# Patient Record
Sex: MALE | Race: WHITE | NOT HISPANIC OR LATINO
[De-identification: names, ages, dates, MRNs, and addresses within clinical notes are randomized per-mention and may not be internally consistent; named-entity substitution may affect disease eponyms.]

---

## 2017-01-12 ENCOUNTER — RX ONLY (OUTPATIENT)
Age: 32
Setting detail: RX ONLY
End: 2017-01-12

## 2019-07-21 ENCOUNTER — APPOINTMENT (OUTPATIENT)
Dept: RADIOLOGY | Facility: MEDICAL CENTER | Age: 34
End: 2019-07-21
Attending: EMERGENCY MEDICINE
Payer: COMMERCIAL

## 2019-07-21 ENCOUNTER — HOSPITAL ENCOUNTER (EMERGENCY)
Facility: MEDICAL CENTER | Age: 34
End: 2019-07-21
Attending: EMERGENCY MEDICINE
Payer: COMMERCIAL

## 2019-07-21 VITALS
SYSTOLIC BLOOD PRESSURE: 130 MMHG | BODY MASS INDEX: 27.46 KG/M2 | DIASTOLIC BLOOD PRESSURE: 85 MMHG | TEMPERATURE: 98.6 F | HEIGHT: 70 IN | HEART RATE: 67 BPM | RESPIRATION RATE: 16 BRPM | WEIGHT: 191.8 LBS

## 2019-07-21 DIAGNOSIS — R56.9 SEIZURE (HCC): ICD-10-CM

## 2019-07-21 DIAGNOSIS — R45.1 AGITATION: ICD-10-CM

## 2019-07-21 DIAGNOSIS — R40.4 TRANSIENT ALTERATION OF AWARENESS: ICD-10-CM

## 2019-07-21 LAB
ALBUMIN SERPL BCP-MCNC: 4.5 G/DL (ref 3.2–4.9)
ALBUMIN/GLOB SERPL: 2 G/DL
ALP SERPL-CCNC: 65 U/L (ref 30–99)
ALT SERPL-CCNC: 97 U/L (ref 2–50)
ANION GAP SERPL CALC-SCNC: 8 MMOL/L (ref 0–11.9)
AST SERPL-CCNC: 54 U/L (ref 12–45)
BASOPHILS # BLD AUTO: 0.6 % (ref 0–1.8)
BASOPHILS # BLD: 0.04 K/UL (ref 0–0.12)
BILIRUB SERPL-MCNC: 0.9 MG/DL (ref 0.1–1.5)
BUN SERPL-MCNC: 7 MG/DL (ref 8–22)
CALCIUM SERPL-MCNC: 9.2 MG/DL (ref 8.5–10.5)
CHLORIDE SERPL-SCNC: 105 MMOL/L (ref 96–112)
CK SERPL-CCNC: 197 U/L (ref 0–154)
CO2 SERPL-SCNC: 26 MMOL/L (ref 20–33)
CREAT SERPL-MCNC: 0.82 MG/DL (ref 0.5–1.4)
EOSINOPHIL # BLD AUTO: 0 K/UL (ref 0–0.51)
EOSINOPHIL NFR BLD: 0 % (ref 0–6.9)
ERYTHROCYTE [DISTWIDTH] IN BLOOD BY AUTOMATED COUNT: 37.9 FL (ref 35.9–50)
GLOBULIN SER CALC-MCNC: 2.3 G/DL (ref 1.9–3.5)
GLUCOSE SERPL-MCNC: 98 MG/DL (ref 65–99)
HCT VFR BLD AUTO: 50.1 % (ref 42–52)
HGB BLD-MCNC: 17.4 G/DL (ref 14–18)
IMM GRANULOCYTES # BLD AUTO: 0.08 K/UL (ref 0–0.11)
IMM GRANULOCYTES NFR BLD AUTO: 1.1 % (ref 0–0.9)
LYMPHOCYTES # BLD AUTO: 2.74 K/UL (ref 1–4.8)
LYMPHOCYTES NFR BLD: 38.5 % (ref 22–41)
MCH RBC QN AUTO: 28.7 PG (ref 27–33)
MCHC RBC AUTO-ENTMCNC: 34.7 G/DL (ref 33.7–35.3)
MCV RBC AUTO: 82.5 FL (ref 81.4–97.8)
MONOCYTES # BLD AUTO: 0.61 K/UL (ref 0–0.85)
MONOCYTES NFR BLD AUTO: 8.6 % (ref 0–13.4)
NEUTROPHILS # BLD AUTO: 3.64 K/UL (ref 1.82–7.42)
NEUTROPHILS NFR BLD: 51.2 % (ref 44–72)
NRBC # BLD AUTO: 0 K/UL
NRBC BLD-RTO: 0 /100 WBC
PLATELET # BLD AUTO: 220 K/UL (ref 164–446)
PMV BLD AUTO: 9.1 FL (ref 9–12.9)
POTASSIUM SERPL-SCNC: 4.1 MMOL/L (ref 3.6–5.5)
PROT SERPL-MCNC: 6.8 G/DL (ref 6–8.2)
RBC # BLD AUTO: 6.07 M/UL (ref 4.7–6.1)
SODIUM SERPL-SCNC: 139 MMOL/L (ref 135–145)
WBC # BLD AUTO: 7.1 K/UL (ref 4.8–10.8)

## 2019-07-21 PROCEDURE — 700105 HCHG RX REV CODE 258: Performed by: EMERGENCY MEDICINE

## 2019-07-21 PROCEDURE — 80053 COMPREHEN METABOLIC PANEL: CPT

## 2019-07-21 PROCEDURE — 85025 COMPLETE CBC W/AUTO DIFF WBC: CPT

## 2019-07-21 PROCEDURE — 96374 THER/PROPH/DIAG INJ IV PUSH: CPT

## 2019-07-21 PROCEDURE — 82550 ASSAY OF CK (CPK): CPT

## 2019-07-21 PROCEDURE — 99285 EMERGENCY DEPT VISIT HI MDM: CPT

## 2019-07-21 PROCEDURE — 700111 HCHG RX REV CODE 636 W/ 250 OVERRIDE (IP): Performed by: EMERGENCY MEDICINE

## 2019-07-21 PROCEDURE — 304561 HCHG STAT O2

## 2019-07-21 PROCEDURE — 70450 CT HEAD/BRAIN W/O DYE: CPT

## 2019-07-21 RX ORDER — SODIUM CHLORIDE 9 MG/ML
1000 INJECTION, SOLUTION INTRAVENOUS ONCE
Status: COMPLETED | OUTPATIENT
Start: 2019-07-21 | End: 2019-07-21

## 2019-07-21 RX ORDER — LORAZEPAM 2 MG/ML
1 INJECTION INTRAMUSCULAR ONCE
Status: COMPLETED | OUTPATIENT
Start: 2019-07-21 | End: 2019-07-21

## 2019-07-21 RX ADMIN — LORAZEPAM 1 MG: 2 INJECTION INTRAMUSCULAR; INTRAVENOUS at 13:00

## 2019-07-21 RX ADMIN — SODIUM CHLORIDE 1000 ML: 9 INJECTION, SOLUTION INTRAVENOUS at 13:00
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
## 2019-07-21 NOTE — ED NOTES
Pt placed in 2pt restraints. Pt awake and alert but very forgetful and asking the same questions about every 30 seconds

## 2019-07-22 ENCOUNTER — PATIENT OUTREACH (OUTPATIENT)
Dept: HEALTH INFORMATION MANAGEMENT | Facility: OTHER | Age: 34
End: 2019-07-22

## 2019-10-02 ENCOUNTER — APPOINTMENT (RX ONLY)
Dept: URBAN - METROPOLITAN AREA CLINIC 4 | Facility: CLINIC | Age: 34
Setting detail: DERMATOLOGY
End: 2019-10-02

## 2019-10-02 ENCOUNTER — RX ONLY (OUTPATIENT)
Age: 34
Setting detail: RX ONLY
End: 2019-10-02

## 2019-10-02 DIAGNOSIS — L30.9 DERMATITIS, UNSPECIFIED: ICD-10-CM

## 2019-10-02 PROCEDURE — 11104 PUNCH BX SKIN SINGLE LESION: CPT

## 2019-10-02 PROCEDURE — ? KOH PREP

## 2019-10-02 PROCEDURE — ? BIOPSY BY PUNCH METHOD

## 2019-10-02 PROCEDURE — ? COUNSELING

## 2019-10-02 PROCEDURE — ? ADDITIONAL NOTES

## 2019-10-02 PROCEDURE — ? ORDER TESTS

## 2019-10-02 PROCEDURE — 87220 TISSUE EXAM FOR FUNGI: CPT

## 2019-10-02 RX ORDER — TRIAMCINOLONE ACETONIDE 1 MG/G
OINTMENT TOPICAL
Qty: 1 | Refills: 0 | Status: ERX | COMMUNITY
Start: 2019-10-02

## 2019-10-02 ASSESSMENT — LOCATION ZONE DERM
LOCATION ZONE: LEG
LOCATION ZONE: TRUNK
LOCATION ZONE: ARM

## 2019-10-02 ASSESSMENT — LOCATION DETAILED DESCRIPTION DERM
LOCATION DETAILED: RIGHT ANTECUBITAL SKIN
LOCATION DETAILED: RIGHT PROXIMAL POSTERIOR UPPER ARM
LOCATION DETAILED: LEFT PROXIMAL POSTERIOR UPPER ARM
LOCATION DETAILED: LEFT ANTECUBITAL SKIN
LOCATION DETAILED: LEFT ANTERIOR DISTAL THIGH
LOCATION DETAILED: RIGHT INFERIOR LATERAL LOWER BACK
LOCATION DETAILED: RIGHT ANTERIOR DISTAL THIGH

## 2019-10-02 ASSESSMENT — LOCATION SIMPLE DESCRIPTION DERM
LOCATION SIMPLE: RIGHT ELBOW
LOCATION SIMPLE: RIGHT LOWER BACK
LOCATION SIMPLE: RIGHT UPPER ARM
LOCATION SIMPLE: RIGHT THIGH
LOCATION SIMPLE: LEFT UPPER ARM
LOCATION SIMPLE: LEFT THIGH
LOCATION SIMPLE: LEFT ELBOW

## 2019-10-02 NOTE — PROCEDURE: BIOPSY BY PUNCH METHOD
Render Post-Care Instructions In Note?: no
Anesthesia Volume In Cc: 0.5
Billing Type: Third-Party Bill
Epidermal Sutures: 4-0 Ethilon
X Depth Of Punch In Cm (Optional): 0
Lab: [id number]
Was A Bandage Applied: Yes
Hemostasis: None
Biopsy Type: H and E
Dressing: bandage
Post-Care Instructions: I reviewed with the patient in detail post-care instructions. Patient is to keep the biopsy site dry overnight, and then apply bacitracin twice daily until healed. Patient may apply hydrogen peroxide soaks to remove any crusting.
Lab Facility: 
Anesthesia Type: 1% lidocaine with epinephrine
Home Suture Removal Text: Patient was provided a home suture removal kit and will remove their sutures at home.  If they have any questions or difficulties they will call the office.
Notification Instructions: Patient will be notified of biopsy results. However, patient instructed to call the office if not contacted within 2 weeks.
Detail Level: Detailed
Suture Removal: 14 days
Consent: Written consent was obtained and risks were reviewed including but not limited to scarring, infection, bleeding, scabbing, incomplete removal, nerve damage and allergy to anesthesia.
Wound Care: Petrolatum
Punch Size In Mm: 4

## 2019-10-02 NOTE — PROCEDURE: ADDITIONAL NOTES
Detail Level: Simple
Additional Notes: Patient was started on lamotrigine 6 weeks ago. This medication is associated with multiple cutaneous reactions however they are typically more diffuse and widespread, where Nadeem's rash is more isolated to the posterior arms, antecubital fossa, and lower back. His morphology fits with an eczematous reaction, such as ACD, however since he has failed 2 weeks of triamcinolone, and this appears to be an ongoing eruption over many years, offered biopsy.\\n\\nBiopsy was performed today, will contact patient with results.\\n\\nAlthough unlikely to be DRESS (drug reaction with eosinophilia and systemic symptoms) given localized nature- will check CBC and CMP to evaluate for eosinophilia/LFT abnormalities. \\n\\nPatient to contact our office if worsening eruption.\\n

## 2019-10-02 NOTE — PROCEDURE: ORDER TESTS
Billing Type: Third-Party Bill
Bill For Surgical Tray: no
Expected Date Of Service: 10/02/2019
Performing Laboratory: [id number]

## 2019-10-02 NOTE — HPI: RASH
Is This A New Presentation, Or A Follow-Up?: Rash
Additional History: These topical treatments have not helped. Patient started lamotrigine about 6 weeks ago, concerned that this could be related to the rash. He has follow up with his neurologist on 10/9. Denies fever, chills, no facial/hand swelling, no mouth sores.\\n\\nPatient has a history of itchy rashes on and off in similar areas, but states it has never been this bad. He has never had a biopsy.\\n\\nPatient raises owls and other birds.

## 2019-10-05 ENCOUNTER — APPOINTMENT (RX ONLY)
Dept: URBAN - METROPOLITAN AREA CLINIC 4 | Facility: CLINIC | Age: 34
Setting detail: DERMATOLOGY
End: 2019-10-05

## 2019-10-16 ENCOUNTER — APPOINTMENT (RX ONLY)
Dept: URBAN - METROPOLITAN AREA CLINIC 4 | Facility: CLINIC | Age: 34
Setting detail: DERMATOLOGY
End: 2019-10-16

## 2019-10-16 DIAGNOSIS — Z48.02 ENCOUNTER FOR REMOVAL OF SUTURES: ICD-10-CM

## 2019-10-16 PROCEDURE — ? ADDITIONAL NOTES

## 2019-10-16 PROCEDURE — ? SUTURE REMOVAL (NO GLOBAL PERIOD)

## 2019-10-16 ASSESSMENT — LOCATION ZONE DERM: LOCATION ZONE: ARM

## 2019-10-16 ASSESSMENT — LOCATION DETAILED DESCRIPTION DERM: LOCATION DETAILED: RIGHT POSTERIOR SHOULDER

## 2019-10-16 ASSESSMENT — LOCATION SIMPLE DESCRIPTION DERM: LOCATION SIMPLE: RIGHT SHOULDER

## 2019-10-16 NOTE — PROCEDURE: ADDITIONAL NOTES
Additional Notes: Dr. Lo looked at dermatitis, it is resolved with triamcinolone. Also discussed lab results and provided pt. with a copy
Detail Level: Simple

## 2020-02-06 ENCOUNTER — RX ONLY (OUTPATIENT)
Age: 35
Setting detail: RX ONLY
End: 2020-02-06

## 2020-02-06 RX ORDER — TRIAMCINOLONE ACETONIDE 1 MG/G
OINTMENT TOPICAL
Qty: 1 | Refills: 2 | Status: ERX

## 2021-04-14 ENCOUNTER — IMMUNIZATION (OUTPATIENT)
Dept: FAMILY PLANNING/WOMEN'S HEALTH CLINIC | Facility: IMMUNIZATION CENTER | Age: 36
End: 2021-04-14
Payer: COMMERCIAL

## 2021-04-14 DIAGNOSIS — Z23 ENCOUNTER FOR VACCINATION: Primary | ICD-10-CM

## 2021-04-14 PROCEDURE — 91300 PFIZER SARS-COV-2 VACCINE: CPT

## 2021-04-14 PROCEDURE — 0001A PFIZER SARS-COV-2 VACCINE: CPT

## 2021-05-07 ENCOUNTER — IMMUNIZATION (OUTPATIENT)
Dept: FAMILY PLANNING/WOMEN'S HEALTH CLINIC | Facility: IMMUNIZATION CENTER | Age: 36
End: 2021-05-07
Payer: COMMERCIAL

## 2021-05-07 DIAGNOSIS — Z23 ENCOUNTER FOR VACCINATION: Primary | ICD-10-CM

## 2021-05-07 PROCEDURE — 0002A PFIZER SARS-COV-2 VACCINE: CPT | Performed by: INTERNAL MEDICINE

## 2021-05-07 PROCEDURE — 91300 PFIZER SARS-COV-2 VACCINE: CPT | Performed by: INTERNAL MEDICINE

## 2022-07-19 ENCOUNTER — APPOINTMENT (OUTPATIENT)
Dept: RADIOLOGY | Facility: MEDICAL CENTER | Age: 37
DRG: 392 | End: 2022-07-19
Attending: EMERGENCY MEDICINE
Payer: OTHER MISCELLANEOUS

## 2022-07-19 ENCOUNTER — HOSPITAL ENCOUNTER (INPATIENT)
Facility: MEDICAL CENTER | Age: 37
LOS: 2 days | DRG: 392 | End: 2022-07-22
Attending: EMERGENCY MEDICINE | Admitting: SURGERY
Payer: OTHER MISCELLANEOUS

## 2022-07-19 DIAGNOSIS — K66.8 INTRA-ABDOMINAL FREE AIR OF UNKNOWN ETIOLOGY: ICD-10-CM

## 2022-07-19 DIAGNOSIS — R10.9 ABDOMINAL PAIN, UNSPECIFIED ABDOMINAL LOCATION: ICD-10-CM

## 2022-07-19 DIAGNOSIS — K63.1 BOWEL PERFORATION (HCC): ICD-10-CM

## 2022-07-19 LAB
ALBUMIN SERPL BCP-MCNC: 4.7 G/DL (ref 3.2–4.9)
ALBUMIN/GLOB SERPL: 1.9 G/DL
ALP SERPL-CCNC: 102 U/L (ref 30–99)
ALT SERPL-CCNC: 77 U/L (ref 2–50)
ANION GAP SERPL CALC-SCNC: 14 MMOL/L (ref 7–16)
AST SERPL-CCNC: 38 U/L (ref 12–45)
BASOPHILS # BLD AUTO: 0.2 % (ref 0–1.8)
BASOPHILS # BLD: 0.02 K/UL (ref 0–0.12)
BILIRUB SERPL-MCNC: 1.1 MG/DL (ref 0.1–1.5)
BUN SERPL-MCNC: 8 MG/DL (ref 8–22)
CALCIUM SERPL-MCNC: 9.3 MG/DL (ref 8.5–10.5)
CHLORIDE SERPL-SCNC: 104 MMOL/L (ref 96–112)
CO2 SERPL-SCNC: 22 MMOL/L (ref 20–33)
CREAT SERPL-MCNC: 0.81 MG/DL (ref 0.5–1.4)
EOSINOPHIL # BLD AUTO: 0.05 K/UL (ref 0–0.51)
EOSINOPHIL NFR BLD: 0.4 % (ref 0–6.9)
ERYTHROCYTE [DISTWIDTH] IN BLOOD BY AUTOMATED COUNT: 41.3 FL (ref 35.9–50)
GFR SERPLBLD CREATININE-BSD FMLA CKD-EPI: 115 ML/MIN/1.73 M 2
GLOBULIN SER CALC-MCNC: 2.5 G/DL (ref 1.9–3.5)
GLUCOSE SERPL-MCNC: 121 MG/DL (ref 65–99)
HCT VFR BLD AUTO: 51.8 % (ref 42–52)
HGB BLD-MCNC: 18.2 G/DL (ref 14–18)
IMM GRANULOCYTES # BLD AUTO: 0.06 K/UL (ref 0–0.11)
IMM GRANULOCYTES NFR BLD AUTO: 0.5 % (ref 0–0.9)
LIPASE SERPL-CCNC: 23 U/L (ref 11–82)
LYMPHOCYTES # BLD AUTO: 1.8 K/UL (ref 1–4.8)
LYMPHOCYTES NFR BLD: 14.2 % (ref 22–41)
MCH RBC QN AUTO: 28.5 PG (ref 27–33)
MCHC RBC AUTO-ENTMCNC: 35.1 G/DL (ref 33.7–35.3)
MCV RBC AUTO: 81.2 FL (ref 81.4–97.8)
MONOCYTES # BLD AUTO: 0.95 K/UL (ref 0–0.85)
MONOCYTES NFR BLD AUTO: 7.5 % (ref 0–13.4)
NEUTROPHILS # BLD AUTO: 9.76 K/UL (ref 1.82–7.42)
NEUTROPHILS NFR BLD: 77.2 % (ref 44–72)
NRBC # BLD AUTO: 0 K/UL
NRBC BLD-RTO: 0 /100 WBC
PLATELET # BLD AUTO: 225 K/UL (ref 164–446)
PMV BLD AUTO: 9.5 FL (ref 9–12.9)
POTASSIUM SERPL-SCNC: 4.1 MMOL/L (ref 3.6–5.5)
PROT SERPL-MCNC: 7.2 G/DL (ref 6–8.2)
RBC # BLD AUTO: 6.38 M/UL (ref 4.7–6.1)
SODIUM SERPL-SCNC: 140 MMOL/L (ref 135–145)
WBC # BLD AUTO: 12.6 K/UL (ref 4.8–10.8)

## 2022-07-19 PROCEDURE — 83690 ASSAY OF LIPASE: CPT

## 2022-07-19 PROCEDURE — 80053 COMPREHEN METABOLIC PANEL: CPT

## 2022-07-19 PROCEDURE — 700111 HCHG RX REV CODE 636 W/ 250 OVERRIDE (IP): Performed by: EMERGENCY MEDICINE

## 2022-07-19 PROCEDURE — 74176 CT ABD & PELVIS W/O CONTRAST: CPT

## 2022-07-19 PROCEDURE — 85025 COMPLETE CBC W/AUTO DIFF WBC: CPT

## 2022-07-19 PROCEDURE — 99285 EMERGENCY DEPT VISIT HI MDM: CPT

## 2022-07-19 PROCEDURE — 96375 TX/PRO/DX INJ NEW DRUG ADDON: CPT

## 2022-07-19 PROCEDURE — 36415 COLL VENOUS BLD VENIPUNCTURE: CPT

## 2022-07-19 RX ORDER — MORPHINE SULFATE 4 MG/ML
4 INJECTION INTRAVENOUS ONCE
Status: COMPLETED | OUTPATIENT
Start: 2022-07-19 | End: 2022-07-19

## 2022-07-19 RX ORDER — ONDANSETRON 2 MG/ML
4 INJECTION INTRAMUSCULAR; INTRAVENOUS ONCE
Status: COMPLETED | OUTPATIENT
Start: 2022-07-19 | End: 2022-07-19

## 2022-07-19 RX ADMIN — ONDANSETRON 4 MG: 2 INJECTION, SOLUTION INTRAMUSCULAR; INTRAVENOUS at 23:37

## 2022-07-19 RX ADMIN — MORPHINE SULFATE 4 MG: 4 INJECTION INTRAVENOUS at 23:37

## 2022-07-19 NOTE — LETTER
July 22, 2022       Patient: Murray Nuñez   YOB: 1985   Date of Visit: 7/19/2022         To Whom It May Concern:    In my medical opinion, I recommend that Murray Nuñez return to light duty with the following restrictions no strenuous activity or lifting greater than 15 lbs for two weeks.    If you have any questions or concerns, please don't hesitate to call            Sincerely,          JACKIE Hernandez

## 2022-07-20 PROBLEM — K63.1 PERFORATION BOWEL (HCC): Status: ACTIVE | Noted: 2022-07-20

## 2022-07-20 PROBLEM — F43.10 PTSD (POST-TRAUMATIC STRESS DISORDER): Status: ACTIVE | Noted: 2022-07-20

## 2022-07-20 PROBLEM — K63.1 BOWEL PERFORATION (HCC): Status: ACTIVE | Noted: 2022-07-20

## 2022-07-20 LAB
APPEARANCE UR: CLEAR
BACTERIA #/AREA URNS HPF: NEGATIVE /HPF
BILIRUB UR QL STRIP.AUTO: ABNORMAL
COLOR UR: ABNORMAL
EPI CELLS #/AREA URNS HPF: ABNORMAL /HPF
GLUCOSE UR STRIP.AUTO-MCNC: NEGATIVE MG/DL
HYALINE CASTS #/AREA URNS LPF: ABNORMAL /LPF
KETONES UR STRIP.AUTO-MCNC: ABNORMAL MG/DL
LEUKOCYTE ESTERASE UR QL STRIP.AUTO: ABNORMAL
MICRO URNS: ABNORMAL
MUCOUS THREADS #/AREA URNS HPF: ABNORMAL /HPF
NITRITE UR QL STRIP.AUTO: NEGATIVE
PH UR STRIP.AUTO: 5.5 [PH] (ref 5–8)
PROT UR QL STRIP: NEGATIVE MG/DL
RBC # URNS HPF: ABNORMAL /HPF
RBC UR QL AUTO: NEGATIVE
SP GR UR STRIP.AUTO: 1.02
SPERM #/AREA URNS HPF: ABNORMAL /HPF
UROBILINOGEN UR STRIP.AUTO-MCNC: 1 MG/DL
WBC #/AREA URNS HPF: ABNORMAL /HPF

## 2022-07-20 PROCEDURE — 700102 HCHG RX REV CODE 250 W/ 637 OVERRIDE(OP): Performed by: EMERGENCY MEDICINE

## 2022-07-20 PROCEDURE — 700111 HCHG RX REV CODE 636 W/ 250 OVERRIDE (IP): Performed by: SURGERY

## 2022-07-20 PROCEDURE — A9270 NON-COVERED ITEM OR SERVICE: HCPCS | Performed by: REGISTERED NURSE

## 2022-07-20 PROCEDURE — A9270 NON-COVERED ITEM OR SERVICE: HCPCS | Performed by: EMERGENCY MEDICINE

## 2022-07-20 PROCEDURE — 81001 URINALYSIS AUTO W/SCOPE: CPT

## 2022-07-20 PROCEDURE — 700111 HCHG RX REV CODE 636 W/ 250 OVERRIDE (IP): Performed by: EMERGENCY MEDICINE

## 2022-07-20 PROCEDURE — 99222 1ST HOSP IP/OBS MODERATE 55: CPT | Performed by: SURGERY

## 2022-07-20 PROCEDURE — 96376 TX/PRO/DX INJ SAME DRUG ADON: CPT

## 2022-07-20 PROCEDURE — 700102 HCHG RX REV CODE 250 W/ 637 OVERRIDE(OP): Performed by: SURGERY

## 2022-07-20 PROCEDURE — 96365 THER/PROPH/DIAG IV INF INIT: CPT

## 2022-07-20 PROCEDURE — 700105 HCHG RX REV CODE 258: Performed by: EMERGENCY MEDICINE

## 2022-07-20 PROCEDURE — 700105 HCHG RX REV CODE 258: Performed by: SURGERY

## 2022-07-20 PROCEDURE — A9270 NON-COVERED ITEM OR SERVICE: HCPCS | Performed by: SURGERY

## 2022-07-20 PROCEDURE — 770006 HCHG ROOM/CARE - MED/SURG/GYN SEMI*

## 2022-07-20 PROCEDURE — 96375 TX/PRO/DX INJ NEW DRUG ADDON: CPT

## 2022-07-20 PROCEDURE — 700102 HCHG RX REV CODE 250 W/ 637 OVERRIDE(OP): Performed by: REGISTERED NURSE

## 2022-07-20 PROCEDURE — 36415 COLL VENOUS BLD VENIPUNCTURE: CPT

## 2022-07-20 RX ORDER — METHOCARBAMOL 750 MG/1
750 TABLET, FILM COATED ORAL
COMMUNITY

## 2022-07-20 RX ORDER — SODIUM CHLORIDE 9 MG/ML
1000 INJECTION, SOLUTION INTRAVENOUS ONCE
Status: COMPLETED | OUTPATIENT
Start: 2022-07-20 | End: 2022-07-20

## 2022-07-20 RX ORDER — METHOCARBAMOL 750 MG/1
750 TABLET, FILM COATED ORAL
Status: DISCONTINUED | OUTPATIENT
Start: 2022-07-20 | End: 2022-07-22 | Stop reason: HOSPADM

## 2022-07-20 RX ORDER — ONDANSETRON 4 MG/1
4 TABLET, ORALLY DISINTEGRATING ORAL EVERY 4 HOURS PRN
Status: DISCONTINUED | OUTPATIENT
Start: 2022-07-20 | End: 2022-07-22 | Stop reason: HOSPADM

## 2022-07-20 RX ORDER — AMOXICILLIN 250 MG
1 CAPSULE ORAL NIGHTLY
Status: DISCONTINUED | OUTPATIENT
Start: 2022-07-20 | End: 2022-07-22 | Stop reason: HOSPADM

## 2022-07-20 RX ORDER — OXYCODONE HYDROCHLORIDE 5 MG/1
5 TABLET ORAL
Status: DISCONTINUED | OUTPATIENT
Start: 2022-07-20 | End: 2022-07-22 | Stop reason: HOSPADM

## 2022-07-20 RX ORDER — BISACODYL 10 MG
10 SUPPOSITORY, RECTAL RECTAL
Status: DISCONTINUED | OUTPATIENT
Start: 2022-07-20 | End: 2022-07-22 | Stop reason: HOSPADM

## 2022-07-20 RX ORDER — HYDROMORPHONE HYDROCHLORIDE 1 MG/ML
0.5 INJECTION, SOLUTION INTRAMUSCULAR; INTRAVENOUS; SUBCUTANEOUS
Status: DISCONTINUED | OUTPATIENT
Start: 2022-07-20 | End: 2022-07-22 | Stop reason: HOSPADM

## 2022-07-20 RX ORDER — METHOCARBAMOL 750 MG/1
750 TABLET, FILM COATED ORAL
Status: DISCONTINUED | OUTPATIENT
Start: 2022-07-20 | End: 2022-07-20

## 2022-07-20 RX ORDER — ROSUVASTATIN CALCIUM 20 MG/1
10 TABLET, COATED ORAL ONCE
Status: COMPLETED | OUTPATIENT
Start: 2022-07-20 | End: 2022-07-20

## 2022-07-20 RX ORDER — ACETAMINOPHEN 500 MG
1000 TABLET ORAL EVERY 6 HOURS
Status: DISCONTINUED | OUTPATIENT
Start: 2022-07-20 | End: 2022-07-22 | Stop reason: HOSPADM

## 2022-07-20 RX ORDER — PREGABALIN 75 MG/1
150 CAPSULE ORAL 2 TIMES DAILY
Status: DISCONTINUED | OUTPATIENT
Start: 2022-07-20 | End: 2022-07-20

## 2022-07-20 RX ORDER — AMOXICILLIN 250 MG
1 CAPSULE ORAL
Status: DISCONTINUED | OUTPATIENT
Start: 2022-07-20 | End: 2022-07-22 | Stop reason: HOSPADM

## 2022-07-20 RX ORDER — HYDROXYZINE 50 MG/1
150 TABLET, FILM COATED ORAL
Status: DISCONTINUED | OUTPATIENT
Start: 2022-07-20 | End: 2022-07-22 | Stop reason: HOSPADM

## 2022-07-20 RX ORDER — ONDANSETRON 2 MG/ML
4 INJECTION INTRAMUSCULAR; INTRAVENOUS EVERY 4 HOURS PRN
Status: DISCONTINUED | OUTPATIENT
Start: 2022-07-20 | End: 2022-07-22 | Stop reason: HOSPADM

## 2022-07-20 RX ORDER — ENOXAPARIN SODIUM 100 MG/ML
40 INJECTION SUBCUTANEOUS DAILY
Status: DISCONTINUED | OUTPATIENT
Start: 2022-07-21 | End: 2022-07-22 | Stop reason: HOSPADM

## 2022-07-20 RX ORDER — PREGABALIN 150 MG/1
150 CAPSULE ORAL ONCE
Status: COMPLETED | OUTPATIENT
Start: 2022-07-20 | End: 2022-07-20

## 2022-07-20 RX ORDER — MORPHINE SULFATE 4 MG/ML
4 INJECTION INTRAVENOUS ONCE
Status: COMPLETED | OUTPATIENT
Start: 2022-07-20 | End: 2022-07-20

## 2022-07-20 RX ORDER — SODIUM CHLORIDE, SODIUM LACTATE, POTASSIUM CHLORIDE, CALCIUM CHLORIDE 600; 310; 30; 20 MG/100ML; MG/100ML; MG/100ML; MG/100ML
INJECTION, SOLUTION INTRAVENOUS CONTINUOUS
Status: DISCONTINUED | OUTPATIENT
Start: 2022-07-20 | End: 2022-07-22 | Stop reason: HOSPADM

## 2022-07-20 RX ORDER — HYDROXYZINE 50 MG/1
150 TABLET, FILM COATED ORAL
Status: DISCONTINUED | OUTPATIENT
Start: 2022-07-20 | End: 2022-07-20

## 2022-07-20 RX ORDER — ROSUVASTATIN CALCIUM 10 MG/1
10 TABLET, COATED ORAL EVERY EVENING
COMMUNITY

## 2022-07-20 RX ORDER — ENEMA 19; 7 G/133ML; G/133ML
1 ENEMA RECTAL
Status: DISCONTINUED | OUTPATIENT
Start: 2022-07-20 | End: 2022-07-22 | Stop reason: HOSPADM

## 2022-07-20 RX ORDER — METHOCARBAMOL 750 MG/1
750 TABLET, FILM COATED ORAL ONCE
Status: COMPLETED | OUTPATIENT
Start: 2022-07-20 | End: 2022-07-20

## 2022-07-20 RX ORDER — PREGABALIN 75 MG/1
150 CAPSULE ORAL 2 TIMES DAILY
Status: DISCONTINUED | OUTPATIENT
Start: 2022-07-20 | End: 2022-07-22 | Stop reason: HOSPADM

## 2022-07-20 RX ORDER — LORAZEPAM 2 MG/1
2 TABLET ORAL ONCE
Status: COMPLETED | OUTPATIENT
Start: 2022-07-20 | End: 2022-07-20

## 2022-07-20 RX ORDER — HYDROXYZINE 50 MG/1
150 TABLET, FILM COATED ORAL ONCE
Status: COMPLETED | OUTPATIENT
Start: 2022-07-20 | End: 2022-07-20

## 2022-07-20 RX ORDER — LORAZEPAM 1 MG/1
2 TABLET ORAL
Status: DISCONTINUED | OUTPATIENT
Start: 2022-07-20 | End: 2022-07-22 | Stop reason: HOSPADM

## 2022-07-20 RX ORDER — SERTRALINE HYDROCHLORIDE 100 MG/1
100 TABLET, FILM COATED ORAL DAILY
Status: DISCONTINUED | OUTPATIENT
Start: 2022-07-20 | End: 2022-07-22 | Stop reason: HOSPADM

## 2022-07-20 RX ORDER — CLONAZEPAM 1 MG/1
2.5 TABLET ORAL EVERY EVENING
Status: DISCONTINUED | OUTPATIENT
Start: 2022-07-20 | End: 2022-07-20

## 2022-07-20 RX ORDER — HYDROXYZINE PAMOATE 50 MG/1
150 CAPSULE ORAL
COMMUNITY

## 2022-07-20 RX ORDER — CLONAZEPAM 1 MG/1
2.5 TABLET ORAL NIGHTLY
COMMUNITY

## 2022-07-20 RX ORDER — LORAZEPAM 1 MG/1
2 TABLET ORAL
Status: DISCONTINUED | OUTPATIENT
Start: 2022-07-20 | End: 2022-07-20

## 2022-07-20 RX ORDER — OXYCODONE HYDROCHLORIDE 10 MG/1
10 TABLET ORAL
Status: DISCONTINUED | OUTPATIENT
Start: 2022-07-20 | End: 2022-07-22 | Stop reason: HOSPADM

## 2022-07-20 RX ORDER — SERTRALINE HYDROCHLORIDE 100 MG/1
100 TABLET, FILM COATED ORAL DAILY
COMMUNITY

## 2022-07-20 RX ORDER — CLONAZEPAM 1 MG/1
2.5 TABLET ORAL EVERY EVENING
Status: DISCONTINUED | OUTPATIENT
Start: 2022-07-20 | End: 2022-07-22 | Stop reason: HOSPADM

## 2022-07-20 RX ORDER — DIPHENHYDRAMINE HCL 25 MG
25 TABLET ORAL EVERY 6 HOURS PRN
Status: DISCONTINUED | OUTPATIENT
Start: 2022-07-20 | End: 2022-07-22 | Stop reason: HOSPADM

## 2022-07-20 RX ORDER — ACETAMINOPHEN 500 MG
1000 TABLET ORAL EVERY 6 HOURS PRN
Status: DISCONTINUED | OUTPATIENT
Start: 2022-07-25 | End: 2022-07-22 | Stop reason: HOSPADM

## 2022-07-20 RX ORDER — LAMOTRIGINE 100 MG/1
100 TABLET ORAL DAILY
Status: DISCONTINUED | OUTPATIENT
Start: 2022-07-20 | End: 2022-07-22 | Stop reason: HOSPADM

## 2022-07-20 RX ORDER — HYDROXYZINE HYDROCHLORIDE 25 MG/1
50 TABLET, FILM COATED ORAL
Status: DISCONTINUED | OUTPATIENT
Start: 2022-07-20 | End: 2022-07-20

## 2022-07-20 RX ORDER — ROSUVASTATIN CALCIUM 10 MG/1
10 TABLET, COATED ORAL EVERY EVENING
Status: DISCONTINUED | OUTPATIENT
Start: 2022-07-20 | End: 2022-07-22 | Stop reason: HOSPADM

## 2022-07-20 RX ADMIN — PIPERACILLIN AND TAZOBACTAM 4.5 G: 4; .5 INJECTION, POWDER, FOR SOLUTION INTRAVENOUS at 13:59

## 2022-07-20 RX ADMIN — MORPHINE SULFATE 4 MG: 4 INJECTION INTRAVENOUS at 00:55

## 2022-07-20 RX ADMIN — LORAZEPAM 2 MG: 1 TABLET ORAL at 18:43

## 2022-07-20 RX ADMIN — OXYCODONE HYDROCHLORIDE 10 MG: 10 TABLET ORAL at 11:57

## 2022-07-20 RX ADMIN — HYDROXYZINE HYDROCHLORIDE 150 MG: 50 TABLET, FILM COATED ORAL at 20:47

## 2022-07-20 RX ADMIN — CLONAZEPAM 2.5 MG: 1 TABLET ORAL at 02:55

## 2022-07-20 RX ADMIN — LORAZEPAM 2 MG: 2 TABLET ORAL at 02:55

## 2022-07-20 RX ADMIN — METHOCARBAMOL 750 MG: 750 TABLET ORAL at 02:55

## 2022-07-20 RX ADMIN — ONDANSETRON 4 MG: 2 INJECTION, SOLUTION INTRAMUSCULAR; INTRAVENOUS at 04:35

## 2022-07-20 RX ADMIN — HYDROMORPHONE HYDROCHLORIDE 0.5 MG: 1 INJECTION, SOLUTION INTRAMUSCULAR; INTRAVENOUS; SUBCUTANEOUS at 09:21

## 2022-07-20 RX ADMIN — LAMOTRIGINE 100 MG: 100 TABLET ORAL at 05:44

## 2022-07-20 RX ADMIN — OXYCODONE HYDROCHLORIDE 10 MG: 10 TABLET ORAL at 15:12

## 2022-07-20 RX ADMIN — SODIUM CHLORIDE, POTASSIUM CHLORIDE, SODIUM LACTATE AND CALCIUM CHLORIDE: 600; 310; 30; 20 INJECTION, SOLUTION INTRAVENOUS at 02:15

## 2022-07-20 RX ADMIN — SODIUM CHLORIDE, POTASSIUM CHLORIDE, SODIUM LACTATE AND CALCIUM CHLORIDE: 600; 310; 30; 20 INJECTION, SOLUTION INTRAVENOUS at 19:18

## 2022-07-20 RX ADMIN — SERTRALINE 100 MG: 100 TABLET, FILM COATED ORAL at 05:44

## 2022-07-20 RX ADMIN — METHOCARBAMOL 750 MG: 750 TABLET ORAL at 18:44

## 2022-07-20 RX ADMIN — HYDROMORPHONE HYDROCHLORIDE 0.5 MG: 1 INJECTION, SOLUTION INTRAMUSCULAR; INTRAVENOUS; SUBCUTANEOUS at 04:35

## 2022-07-20 RX ADMIN — PIPERACILLIN AND TAZOBACTAM 4.5 G: 4; .5 INJECTION, POWDER, FOR SOLUTION INTRAVENOUS at 05:43

## 2022-07-20 RX ADMIN — PIPERACILLIN AND TAZOBACTAM 4.5 G: 4; .5 INJECTION, POWDER, FOR SOLUTION INTRAVENOUS at 21:39

## 2022-07-20 RX ADMIN — ROSUVASTATIN CALCIUM 10 MG: 20 TABLET, FILM COATED ORAL at 01:45

## 2022-07-20 RX ADMIN — SODIUM CHLORIDE 1000 ML: 9 INJECTION, SOLUTION INTRAVENOUS at 00:55

## 2022-07-20 RX ADMIN — PIPERACILLIN AND TAZOBACTAM 4.5 G: 4; .5 INJECTION, POWDER, FOR SOLUTION INTRAVENOUS at 02:54

## 2022-07-20 RX ADMIN — DIPHENHYDRAMINE HYDROCHLORIDE 25 MG: 25 TABLET ORAL at 18:44

## 2022-07-20 RX ADMIN — HYDROXYZINE HYDROCHLORIDE 150 MG: 50 TABLET, FILM COATED ORAL at 02:55

## 2022-07-20 RX ADMIN — SENNOSIDES AND DOCUSATE SODIUM 1 TABLET: 50; 8.6 TABLET ORAL at 20:47

## 2022-07-20 RX ADMIN — PREGABALIN 150 MG: 150 CAPSULE ORAL at 02:55

## 2022-07-20 RX ADMIN — CLONAZEPAM 2.5 MG: 1 TABLET ORAL at 18:43

## 2022-07-20 RX ADMIN — ROSUVASTATIN 10 MG: 10 TABLET, FILM COATED ORAL at 18:44

## 2022-07-20 RX ADMIN — PREGABALIN 150 MG: 75 CAPSULE ORAL at 16:43

## 2022-07-20 ASSESSMENT — PAIN DESCRIPTION - PAIN TYPE
TYPE: ACUTE PAIN

## 2022-07-20 ASSESSMENT — LIFESTYLE VARIABLES
AVERAGE NUMBER OF DAYS PER WEEK YOU HAVE A DRINK CONTAINING ALCOHOL: 0
ALCOHOL_USE: NO
EVER FELT BAD OR GUILTY ABOUT YOUR DRINKING: NO
DOES PATIENT WANT TO STOP DRINKING: NO
HAVE PEOPLE ANNOYED YOU BY CRITICIZING YOUR DRINKING: NO
TOTAL SCORE: 0
HAVE YOU EVER FELT YOU SHOULD CUT DOWN ON YOUR DRINKING: NO
TOTAL SCORE: 0
HOW MANY TIMES IN THE PAST YEAR HAVE YOU HAD 5 OR MORE DRINKS IN A DAY: 0
EVER HAD A DRINK FIRST THING IN THE MORNING TO STEADY YOUR NERVES TO GET RID OF A HANGOVER: NO
ON A TYPICAL DAY WHEN YOU DRINK ALCOHOL HOW MANY DRINKS DO YOU HAVE: 0
TOTAL SCORE: 0
CONSUMPTION TOTAL: NEGATIVE

## 2022-07-20 ASSESSMENT — COPD QUESTIONNAIRES
HAVE YOU SMOKED AT LEAST 100 CIGARETTES IN YOUR ENTIRE LIFE: NO/DON'T KNOW
DURING THE PAST 4 WEEKS HOW MUCH DID YOU FEEL SHORT OF BREATH: NONE/LITTLE OF THE TIME
DO YOU EVER COUGH UP ANY MUCUS OR PHLEGM?: NO/ONLY WITH OCCASIONAL COLDS OR INFECTIONS
COPD SCREENING SCORE: 0

## 2022-07-20 ASSESSMENT — PATIENT HEALTH QUESTIONNAIRE - PHQ9
2. FEELING DOWN, DEPRESSED, IRRITABLE, OR HOPELESS: NOT AT ALL
SUM OF ALL RESPONSES TO PHQ9 QUESTIONS 1 AND 2: 0
1. LITTLE INTEREST OR PLEASURE IN DOING THINGS: NOT AT ALL

## 2022-07-20 NOTE — ED NOTES
Pt requesting pain medications at this time. RN discussed PO pain medication for longer pain control. Pt agreeable to this plan. Continue to await inpatient room.

## 2022-07-20 NOTE — ASSESSMENT & PLAN NOTE
Chronic condition treated with sertraline, lamotrigine, and clonazepam.  Resumed maintenance medication on admission.

## 2022-07-20 NOTE — ED NOTES
"Pt resting in bed on his cell phone. Pt showing no signs of pain but he states \"the pain is a 10\". Pt was asked if he could urinate still cant urinate at this time.   "

## 2022-07-20 NOTE — ED NOTES
Pt c/o worsening pain after going to restroom and trying to push for a bowel movement. Pt states he was unable to have BM.

## 2022-07-20 NOTE — H&P
CHIEF COMPLAINT: Right lower quadrant pain.     HISTORY OF PRESENT ILLNESS: The patient is a 36 year-old White man who presents to the Emergency Department with a 2- day history of moderate and transient right lower quadrant abdominal pain. The pain is associated with nausea, vomiting, anorexia, diarrhea and malaise. The patient denies any recent or intercurrent illness. The patient has undergone cholecystectomy.  He states he has been afebrile.  He has not eaten well in 2-3 days.  He has been able to take his daily meds, which are crucial with his previously known violent PTSD.     PAST MEDICAL HISTORY:  has a past medical history of Brain injury (HCC), Post traumatic seizure disorder (HCC) (2005), and Seizure (HCC).    PAST SURGICAL HISTORY:  has a past surgical history that includes other neurological surg (2005). Craniectomy, Cholecystectomy     ALLERGIES:   Allergies   Allergen Reactions   • Amoxicillin Anaphylaxis   • Amoxicillin        CURRENT MEDICATIONS:    Home Medications     Reviewed by Isatu Marshall PharmD (Pharmacist) on 07/20/22 at 0140  Med List Status: Complete   Medication Last Dose Status   clonazePAM (KLONOPIN) 1 MG Tab  Active   hydrOXYzine pamoate (VISTARIL) 50 MG Cap  Active   LAMICTAL PO  Active   LORAZEPAM  Active   methocarbamol (ROBAXIN) 750 MG Tab  Active   pregabalin (LYRICA) 150 MG CAPS  Active   rosuvastatin (CRESTOR) 10 MG Tab  Active   sertraline (ZOLOFT) 100 MG Tab  Active                FAMILY HISTORY: family history is not on file.    SOCIAL HISTORY:  reports that he has been smoking cigars. He has never used smokeless tobacco. He reports that he does not drink alcohol and does not use drugs.    REVIEW OF SYSTEMS: Comprehensive review of systems is negative with the exception of the aforementioned HPI, PMH, and PSH bullets in accordance with CMS guidelines.    PHYSICAL EXAMINATION:      Constitutional:     Vital Signs: /76   Pulse 72   Temp 36.4 °C (97.6 °F)  "(Temporal)   Resp 18   Ht 1.778 m (5' 10\")   Wt 107 kg (234 lb 12.6 oz)   SpO2 93%    General Appearance: alert in no acute distress, cooperative.   HEENT:    Demonstrates symmetric, reactive pupils. Extraocular muscles   are intact. Nares and oropharynx are clear.   Neck:    Supple. No adenopathy.  Respiratory:   Inspection: Unlabored respirations, no intercostal retractions, paradoxical motion, or accessory muscle use.   Auscultation: normal, clear to auscultation.  Cardiovascular:   Inspection: The skin is warm and dry.  Auscultation: Regular rate and rhythm.   Peripheral Pulses: Normal.   Abdomen:  Inspection: Abdominal inspection reveals no abrasions, contusions, lacerations or penetrating wounds.   Palpation: Palpation is remarkable for moderate tenderness in the right lower quadrant region. No peritonitis.  Extremities:   Examination of the upper and lower extremities demonstrates no cyanosis edema or clubbing.  Neurologic:   Alert & oriented to person, time and place. Normal motor function. Normal sensory function. No focal deficits noted. Flat affect.     LABORATORY VALUES:   Recent Labs     07/19/22 2009   WBC 12.6*   RBC 6.38*   HEMOGLOBIN 18.2*   HEMATOCRIT 51.8   MCV 81.2*   MCH 28.5   MCHC 35.1   RDW 41.3   PLATELETCT 225   MPV 9.5     Recent Labs     07/19/22 2009   SODIUM 140   POTASSIUM 4.1   CHLORIDE 104   CO2 22   GLUCOSE 121*   BUN 8   CREATININE 0.81   CALCIUM 9.3     Recent Labs     07/19/22 2009   ASTSGOT 38   ALTSGPT 77*   TBILIRUBIN 1.1   ALKPHOSPHAT 102*   GLOBULIN 2.5            IMAGING:   CT-ABDOMEN-PELVIS W/O   Final Result         1.  Foci of air tracking along the lateral aspect of the cecum, appears likely within the mesentery, could represent small area of pneumatosis with extraluminal contained air from microperforation. Of uncertain etiology.   2.  Hepatomegaly and diffuse hepatic steatosis   3.  Small fat-containing left inguinal hernia   4.  Bilateral micro pulmonary " nodules, see nodule follow-up recommendations below.      Fleischner Society pulmonary nodule recommendations:      Low Risk: No routine follow-up      High Risk: Optional CT at 12 months      Comments: Use most suspicious nodule as guide to management. Follow-up intervals may vary according to size and risk.      Low Risk - Minimal or absent history of smoking and of other known risk factors.      High Risk - History of smoking or of other known risk factors.      Note: These recommendations do not apply to lung cancer screening, patients with immunosuppression, or patients with known primary cancer.      Fleischner Society 2017 Guidelines for Management of Incidentally Detected Pulmonary Nodules in Adults             ASSESSMENT AND PLAN:     PTSD (post-traumatic stress disorder)- (present on admission)  Assessment & Plan  Resume home meds    Perforation bowel (HCC)- (present on admission)  Assessment & Plan  Right sided colon with microperforation and adjacent extraluminal air.   Conservative management with IV antibiotics.    NPO except home meds and ice chips.   Abdominal exams and serial labs.              ____________________________________     Gisselle Chavez M.D.    DD: 7/20/2022  1:26 AM

## 2022-07-20 NOTE — ED NOTES
Pt report received from DIMITRY Caro. Pt sleeping on gurney at this time. Vitals remain stable on continuous hemodynamic monitoring.

## 2022-07-20 NOTE — ASSESSMENT & PLAN NOTE
Admission CT imaging demonstrated right-sided extraluminal air consistent with colonic microperforation.  Nonoperative management.    7/21 piperacillin tazobactam day 2 of 7.

## 2022-07-20 NOTE — ED PROVIDER NOTES
"ED Provider Note    CHIEF COMPLAINT  Chief Complaint   Patient presents with   • Abdominal Pain     R sided pain that radiates into belly button area, throbbing sensation   • Diarrhea     Stated 1 1/2 days ago   • Urinary Retention     Has not voided since this morning       HPI  Murray Nuñez is a 36 y.o. male who presents for evaluation of right upper and right mid abdomen pain for 2 days with associated numerous episodes of watery diarrhea and vomiting.  Patient notes he has not been able to keep food down but has been able to keep sips of fluids.  He notes no fevers or chills and notes that he has had a cholecystectomy in the past.  He feels like he can \"feel a heartbeat\" under his umbilicus and also has a mild amount of cramping in this area.    REVIEW OF SYSTEMS  Constitutional: No fevers or chills  Skin: No rashes, abrasions, or lacerations  HEENT: No sore throat, or runny nose  Chest: No pain or rashes  Pulm: No shortness of breath, cough, wheezing, stridor, or pain with inspiration/expiration  Gastrointestinal: No constipation, bloating, melena, or hematochezia  Genitourinary: No dysuria or hematuria  Musculoskeletal: No recent trauma, pain, swelling, weakness  Heme: No bleeding or bruising problems.   Immuno: No hx of recurrent infections    PAST FAM HISTORY  History reviewed. No pertinent family history.    PAST MEDICAL HISTORY   has a past medical history of Brain injury (HCC), Post traumatic seizure disorder (HCC) (2005), and Seizure (HCC).    SOCIAL HISTORY  Social History     Tobacco Use   • Smoking status: Current Some Day Smoker     Types: Cigars   • Smokeless tobacco: Never Used   Vaping Use   • Vaping Use: Never used   Substance and Sexual Activity   • Alcohol use: No   • Drug use: No   • Sexual activity: Not on file       SURGICAL HISTORY   has a past surgical history that includes other neurological surg (2005).    CURRENT MEDICATIONS  Home Medications     Reviewed by Isatu Marshall PharmD " "(Pharmacist) on 07/20/22 at 0140  Med List Status: Complete   Medication Last Dose Status   clonazePAM (KLONOPIN) 1 MG Tab  Active   hydrOXYzine pamoate (VISTARIL) 50 MG Cap  Active   LAMICTAL PO  Active   LORAZEPAM  Active   methocarbamol (ROBAXIN) 750 MG Tab  Active   pregabalin (LYRICA) 150 MG CAPS  Active   rosuvastatin (CRESTOR) 10 MG Tab  Active   sertraline (ZOLOFT) 100 MG Tab  Active                ALLERGIES  Allergies   Allergen Reactions   • Amoxicillin      Hives as a child  Tolerated zosyn on 7/20/22         PHYSICAL EXAM  VITAL SIGNS: BP (!) 143/93   Pulse 77   Temp 36.4 °C (97.6 °F) (Temporal)   Resp 18   Ht 1.778 m (5' 10\")   Wt 107 kg (234 lb 12.6 oz)   SpO2 95%   BMI 33.69 kg/m²    Gen: Alert in no apparent distress.  HEENT: No signs of trauma, Bilateral external ears normal, Nose normal. Conjunctiva normal, Non-icteric.   Cardiovascular: Regular rate and rhythm, no murmurs.  Capillary refill less than 3 seconds to all extremities, 2+ distal pulses.  Thorax & Lungs: Normal breath sounds, No respiratory distress, No wheezing bilateral chest rise  Abdomen: Bowel sounds normal, Soft, diffuse right-sided tenderness, No masses, No pulsatile masses. No Guarding or rebound  Skin: Warm, Dry, No erythema, No rash noted to exposed areas.   Back: No bony tenderness, No CVA tenderness.   Extremities: Intact distal pulses, No edema  Neurologic: Alert , no facial droop, grossly normal coordination and strength  Psychiatric: Affect pleasant      LABS  Results for orders placed or performed during the hospital encounter of 07/19/22   CBC WITH DIFFERENTIAL   Result Value Ref Range    WBC 12.6 (H) 4.8 - 10.8 K/uL    RBC 6.38 (H) 4.70 - 6.10 M/uL    Hemoglobin 18.2 (H) 14.0 - 18.0 g/dL    Hematocrit 51.8 42.0 - 52.0 %    MCV 81.2 (L) 81.4 - 97.8 fL    MCH 28.5 27.0 - 33.0 pg    MCHC 35.1 33.7 - 35.3 g/dL    RDW 41.3 35.9 - 50.0 fL    Platelet Count 225 164 - 446 K/uL    MPV 9.5 9.0 - 12.9 fL    " Neutrophils-Polys 77.20 (H) 44.00 - 72.00 %    Lymphocytes 14.20 (L) 22.00 - 41.00 %    Monocytes 7.50 0.00 - 13.40 %    Eosinophils 0.40 0.00 - 6.90 %    Basophils 0.20 0.00 - 1.80 %    Immature Granulocytes 0.50 0.00 - 0.90 %    Nucleated RBC 0.00 /100 WBC    Neutrophils (Absolute) 9.76 (H) 1.82 - 7.42 K/uL    Lymphs (Absolute) 1.80 1.00 - 4.80 K/uL    Monos (Absolute) 0.95 (H) 0.00 - 0.85 K/uL    Eos (Absolute) 0.05 0.00 - 0.51 K/uL    Baso (Absolute) 0.02 0.00 - 0.12 K/uL    Immature Granulocytes (abs) 0.06 0.00 - 0.11 K/uL    NRBC (Absolute) 0.00 K/uL   COMP METABOLIC PANEL   Result Value Ref Range    Sodium 140 135 - 145 mmol/L    Potassium 4.1 3.6 - 5.5 mmol/L    Chloride 104 96 - 112 mmol/L    Co2 22 20 - 33 mmol/L    Anion Gap 14.0 7.0 - 16.0    Glucose 121 (H) 65 - 99 mg/dL    Bun 8 8 - 22 mg/dL    Creatinine 0.81 0.50 - 1.40 mg/dL    Calcium 9.3 8.5 - 10.5 mg/dL    AST(SGOT) 38 12 - 45 U/L    ALT(SGPT) 77 (H) 2 - 50 U/L    Alkaline Phosphatase 102 (H) 30 - 99 U/L    Total Bilirubin 1.1 0.1 - 1.5 mg/dL    Albumin 4.7 3.2 - 4.9 g/dL    Total Protein 7.2 6.0 - 8.2 g/dL    Globulin 2.5 1.9 - 3.5 g/dL    A-G Ratio 1.9 g/dL   LIPASE   Result Value Ref Range    Lipase 23 11 - 82 U/L   URINALYSIS    Specimen: Urine   Result Value Ref Range    Color DK Yellow     Character Clear     Specific Gravity 1.025 <1.035    Ph 5.5 5.0 - 8.0    Glucose Negative Negative mg/dL    Ketones Trace (A) Negative mg/dL    Protein Negative Negative mg/dL    Bilirubin Small (A) Negative    Urobilinogen, Urine 1.0 Negative    Nitrite Negative Negative    Leukocyte Esterase Trace (A) Negative    Occult Blood Negative Negative    Micro Urine Req Microscopic    ESTIMATED GFR   Result Value Ref Range    GFR (CKD-EPI) 115 >60 mL/min/1.73 m 2   URINE MICROSCOPIC (W/UA)   Result Value Ref Range    WBC 2-5 (A) /hpf    RBC 0-2 (A) /hpf    Bacteria Negative None /hpf    Epithelial Cells Few /hpf    Mucous Threads Moderate /hpf    Hyaline Cast  0-2 /lpf    Sperm Few /hpf       RADIOLOGY  CT-ABDOMEN-PELVIS W/O   Final Result         1.  Foci of air tracking along the lateral aspect of the cecum, appears likely within the mesentery, could represent small area of pneumatosis with extraluminal contained air from microperforation. Of uncertain etiology.   2.  Hepatomegaly and diffuse hepatic steatosis   3.  Small fat-containing left inguinal hernia   4.  Bilateral micro pulmonary nodules, see nodule follow-up recommendations below.      Fleischner Society pulmonary nodule recommendations:      Low Risk: No routine follow-up      High Risk: Optional CT at 12 months      Comments: Use most suspicious nodule as guide to management. Follow-up intervals may vary according to size and risk.      Low Risk - Minimal or absent history of smoking and of other known risk factors.      High Risk - History of smoking or of other known risk factors.      Note: These recommendations do not apply to lung cancer screening, patients with immunosuppression, or patients with known primary cancer.      Fleischner Society 2017 Guidelines for Management of Incidentally Detected Pulmonary Nodules in Adults             HYDRATION: Based on the patient's presentation of Inability to take oral fluids the patient was given IV fluids. IV Hydration was used because oral hydration was not adequate alone. Upon recheck following hydration, the patient was stable.    COURSE & MEDICAL DECISION MAKING  Patient arrives for evaluation of right-sided abdominal pain which is concerning for possible appendicitis.  His labs are reassuring however it is notable that his white blood cell count is 12.6 and his hemoglobin is slightly elevated 18.2.  These are nonspecific and therefore CT will be undertaken.  Patient does not appear septic or toxic and does not have peritoneal signs.  He is hemodynamically stable for right now.    Patient CT came back concerning for free air in the peritoneal cavity adjacent  to the cecum.  This is concerning for possible microperforation.  Case was discussed with the general surgeon who evaluated the patient at bedside.  She will admit primarily for observation and supportive treatment.  At this point the patient will not be taken to the operating room and will be allowed to take his p.o. medications.    FINAL IMPRESSION  1. Abdominal pain, unspecified abdominal location    2. Intra-abdominal free air of unknown etiology        Electronically signed by: Axel Barnes M.D., 7/19/2022 11:16 PM
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
## 2022-07-21 PROBLEM — K63.1 BOWEL PERFORATION (HCC): Status: RESOLVED | Noted: 2022-07-20 | Resolved: 2022-07-21

## 2022-07-21 PROBLEM — K57.30: Status: ACTIVE | Noted: 2022-07-20

## 2022-07-21 PROBLEM — E78.00 HYPERCHOLESTEROLEMIA: Status: ACTIVE | Noted: 2022-07-21

## 2022-07-21 LAB
ALBUMIN SERPL BCP-MCNC: 3.8 G/DL (ref 3.2–4.9)
ALBUMIN/GLOB SERPL: 1.7 G/DL
ALP SERPL-CCNC: 142 U/L (ref 30–99)
ALT SERPL-CCNC: 623 U/L (ref 2–50)
ANION GAP SERPL CALC-SCNC: 13 MMOL/L (ref 7–16)
AST SERPL-CCNC: 388 U/L (ref 12–45)
BASOPHILS # BLD AUTO: 0.4 % (ref 0–1.8)
BASOPHILS # BLD: 0.02 K/UL (ref 0–0.12)
BILIRUB SERPL-MCNC: 2.3 MG/DL (ref 0.1–1.5)
BUN SERPL-MCNC: 9 MG/DL (ref 8–22)
CALCIUM SERPL-MCNC: 8.6 MG/DL (ref 8.5–10.5)
CHLORIDE SERPL-SCNC: 101 MMOL/L (ref 96–112)
CO2 SERPL-SCNC: 24 MMOL/L (ref 20–33)
CREAT SERPL-MCNC: 0.8 MG/DL (ref 0.5–1.4)
EOSINOPHIL # BLD AUTO: 0.02 K/UL (ref 0–0.51)
EOSINOPHIL NFR BLD: 0.4 % (ref 0–6.9)
ERYTHROCYTE [DISTWIDTH] IN BLOOD BY AUTOMATED COUNT: 42 FL (ref 35.9–50)
GFR SERPLBLD CREATININE-BSD FMLA CKD-EPI: 116 ML/MIN/1.73 M 2
GLOBULIN SER CALC-MCNC: 2.3 G/DL (ref 1.9–3.5)
GLUCOSE SERPL-MCNC: 85 MG/DL (ref 65–99)
HCT VFR BLD AUTO: 47.2 % (ref 42–52)
HGB BLD-MCNC: 16.2 G/DL (ref 14–18)
IMM GRANULOCYTES # BLD AUTO: 0.03 K/UL (ref 0–0.11)
IMM GRANULOCYTES NFR BLD AUTO: 0.5 % (ref 0–0.9)
LACTATE SERPL-SCNC: 1 MMOL/L (ref 0.5–2)
LYMPHOCYTES # BLD AUTO: 1.83 K/UL (ref 1–4.8)
LYMPHOCYTES NFR BLD: 32.1 % (ref 22–41)
MAGNESIUM SERPL-MCNC: 1.8 MG/DL (ref 1.5–2.5)
MCH RBC QN AUTO: 28.6 PG (ref 27–33)
MCHC RBC AUTO-ENTMCNC: 34.3 G/DL (ref 33.7–35.3)
MCV RBC AUTO: 83.2 FL (ref 81.4–97.8)
MONOCYTES # BLD AUTO: 0.63 K/UL (ref 0–0.85)
MONOCYTES NFR BLD AUTO: 11.1 % (ref 0–13.4)
NEUTROPHILS # BLD AUTO: 3.17 K/UL (ref 1.82–7.42)
NEUTROPHILS NFR BLD: 55.5 % (ref 44–72)
NRBC # BLD AUTO: 0 K/UL
NRBC BLD-RTO: 0 /100 WBC
PHOSPHATE SERPL-MCNC: 3.5 MG/DL (ref 2.5–4.5)
PLATELET # BLD AUTO: 177 K/UL (ref 164–446)
PMV BLD AUTO: 9.1 FL (ref 9–12.9)
POTASSIUM SERPL-SCNC: 4.2 MMOL/L (ref 3.6–5.5)
PROT SERPL-MCNC: 6.1 G/DL (ref 6–8.2)
RBC # BLD AUTO: 5.67 M/UL (ref 4.7–6.1)
SODIUM SERPL-SCNC: 138 MMOL/L (ref 135–145)
WBC # BLD AUTO: 5.7 K/UL (ref 4.8–10.8)

## 2022-07-21 PROCEDURE — 700102 HCHG RX REV CODE 250 W/ 637 OVERRIDE(OP): Performed by: REGISTERED NURSE

## 2022-07-21 PROCEDURE — 83605 ASSAY OF LACTIC ACID: CPT

## 2022-07-21 PROCEDURE — 99232 SBSQ HOSP IP/OBS MODERATE 35: CPT | Performed by: SURGERY

## 2022-07-21 PROCEDURE — 84100 ASSAY OF PHOSPHORUS: CPT

## 2022-07-21 PROCEDURE — 700105 HCHG RX REV CODE 258: Performed by: SURGERY

## 2022-07-21 PROCEDURE — 83735 ASSAY OF MAGNESIUM: CPT

## 2022-07-21 PROCEDURE — 700102 HCHG RX REV CODE 250 W/ 637 OVERRIDE(OP): Performed by: SURGERY

## 2022-07-21 PROCEDURE — 80053 COMPREHEN METABOLIC PANEL: CPT

## 2022-07-21 PROCEDURE — 85025 COMPLETE CBC W/AUTO DIFF WBC: CPT

## 2022-07-21 PROCEDURE — A9270 NON-COVERED ITEM OR SERVICE: HCPCS | Performed by: SURGERY

## 2022-07-21 PROCEDURE — 770006 HCHG ROOM/CARE - MED/SURG/GYN SEMI*

## 2022-07-21 PROCEDURE — 700111 HCHG RX REV CODE 636 W/ 250 OVERRIDE (IP): Performed by: SURGERY

## 2022-07-21 PROCEDURE — 36415 COLL VENOUS BLD VENIPUNCTURE: CPT

## 2022-07-21 PROCEDURE — A9270 NON-COVERED ITEM OR SERVICE: HCPCS | Performed by: REGISTERED NURSE

## 2022-07-21 RX ADMIN — ENOXAPARIN SODIUM 40 MG: 40 INJECTION SUBCUTANEOUS at 17:36

## 2022-07-21 RX ADMIN — METHOCARBAMOL 750 MG: 750 TABLET ORAL at 18:40

## 2022-07-21 RX ADMIN — HYDROXYZINE HYDROCHLORIDE 150 MG: 50 TABLET, FILM COATED ORAL at 18:39

## 2022-07-21 RX ADMIN — ACETAMINOPHEN 1000 MG: 500 TABLET, FILM COATED ORAL at 12:42

## 2022-07-21 RX ADMIN — SERTRALINE 100 MG: 100 TABLET, FILM COATED ORAL at 05:06

## 2022-07-21 RX ADMIN — DIPHENHYDRAMINE HYDROCHLORIDE 25 MG: 25 TABLET ORAL at 13:53

## 2022-07-21 RX ADMIN — ROSUVASTATIN 10 MG: 10 TABLET, FILM COATED ORAL at 20:43

## 2022-07-21 RX ADMIN — PIPERACILLIN AND TAZOBACTAM 4.5 G: 4; .5 INJECTION, POWDER, FOR SOLUTION INTRAVENOUS at 05:06

## 2022-07-21 RX ADMIN — PREGABALIN 150 MG: 75 CAPSULE ORAL at 17:35

## 2022-07-21 RX ADMIN — PREGABALIN 150 MG: 75 CAPSULE ORAL at 05:06

## 2022-07-21 RX ADMIN — OXYCODONE HYDROCHLORIDE 10 MG: 10 TABLET ORAL at 00:27

## 2022-07-21 RX ADMIN — LAMOTRIGINE 100 MG: 100 TABLET ORAL at 05:06

## 2022-07-21 RX ADMIN — OXYCODONE HYDROCHLORIDE 10 MG: 10 TABLET ORAL at 20:43

## 2022-07-21 RX ADMIN — OXYCODONE HYDROCHLORIDE 10 MG: 10 TABLET ORAL at 13:56

## 2022-07-21 RX ADMIN — PIPERACILLIN AND TAZOBACTAM 4.5 G: 4; .5 INJECTION, POWDER, FOR SOLUTION INTRAVENOUS at 12:42

## 2022-07-21 RX ADMIN — ACETAMINOPHEN 1000 MG: 500 TABLET, FILM COATED ORAL at 17:36

## 2022-07-21 RX ADMIN — PIPERACILLIN AND TAZOBACTAM 4.5 G: 4; .5 INJECTION, POWDER, FOR SOLUTION INTRAVENOUS at 20:43

## 2022-07-21 RX ADMIN — OXYCODONE HYDROCHLORIDE 10 MG: 10 TABLET ORAL at 07:18

## 2022-07-21 RX ADMIN — CLONAZEPAM 2.5 MG: 1 TABLET ORAL at 18:40

## 2022-07-21 RX ADMIN — LORAZEPAM 2 MG: 1 TABLET ORAL at 18:43

## 2022-07-21 ASSESSMENT — PAIN DESCRIPTION - PAIN TYPE
TYPE: ACUTE PAIN
TYPE: ACUTE PAIN

## 2022-07-21 NOTE — PROGRESS NOTES
Assumed care at 1430  Pt a &o x4  Pt states pain to abd, norco as prn   Pt receiving IV Zosyn  LR at 125ml  Pt NPO  Pt up self

## 2022-07-21 NOTE — CARE PLAN
The patient is Stable - Low risk of patient condition declining or worsening         Progress made toward(s) clinical / shift goals:  progressing      Problem: Pain - Standard  Goal: Alleviation of pain or a reduction in pain to the patient’s comfort goal  7/21/2022 0142 by Susan Figueroa R.N.  Outcome: Progressing  Note: Prn pain meds available    Problem: Knowledge Deficit - Standard  Goal: Patient and family/care givers will demonstrate understanding of plan of care, disease process/condition, diagnostic tests and medications  7/21/2022 0142 by Susan Figueroa R.N.  Outcome: Progressing    Problem: Fall Risk  Goal: Patient will remain free from falls  Outcome: Progressing  Note: Safety precautions in place; call light within reach
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
## 2022-07-22 ENCOUNTER — PHARMACY VISIT (OUTPATIENT)
Dept: PHARMACY | Facility: MEDICAL CENTER | Age: 37
End: 2022-07-22
Payer: COMMERCIAL

## 2022-07-22 VITALS
SYSTOLIC BLOOD PRESSURE: 107 MMHG | OXYGEN SATURATION: 92 % | RESPIRATION RATE: 16 BRPM | TEMPERATURE: 97.7 F | HEART RATE: 93 BPM | HEIGHT: 70 IN | WEIGHT: 234.79 LBS | DIASTOLIC BLOOD PRESSURE: 66 MMHG | BODY MASS INDEX: 33.61 KG/M2

## 2022-07-22 LAB
ALBUMIN SERPL BCP-MCNC: 3.7 G/DL (ref 3.2–4.9)
ALBUMIN/GLOB SERPL: 1.5 G/DL
ALP SERPL-CCNC: 151 U/L (ref 30–99)
ALT SERPL-CCNC: 475 U/L (ref 2–50)
ANION GAP SERPL CALC-SCNC: 10 MMOL/L (ref 7–16)
AST SERPL-CCNC: 188 U/L (ref 12–45)
BASOPHILS # BLD AUTO: 0.1 % (ref 0–1.8)
BASOPHILS # BLD: 0.01 K/UL (ref 0–0.12)
BILIRUB SERPL-MCNC: 1.7 MG/DL (ref 0.1–1.5)
BUN SERPL-MCNC: 8 MG/DL (ref 8–22)
CALCIUM SERPL-MCNC: 8.9 MG/DL (ref 8.5–10.5)
CHLORIDE SERPL-SCNC: 101 MMOL/L (ref 96–112)
CO2 SERPL-SCNC: 28 MMOL/L (ref 20–33)
CREAT SERPL-MCNC: 0.73 MG/DL (ref 0.5–1.4)
EOSINOPHIL # BLD AUTO: 0.01 K/UL (ref 0–0.51)
EOSINOPHIL NFR BLD: 0.1 % (ref 0–6.9)
ERYTHROCYTE [DISTWIDTH] IN BLOOD BY AUTOMATED COUNT: 40.7 FL (ref 35.9–50)
GFR SERPLBLD CREATININE-BSD FMLA CKD-EPI: 119 ML/MIN/1.73 M 2
GLOBULIN SER CALC-MCNC: 2.4 G/DL (ref 1.9–3.5)
GLUCOSE SERPL-MCNC: 98 MG/DL (ref 65–99)
HCT VFR BLD AUTO: 45.1 % (ref 42–52)
HGB BLD-MCNC: 15.8 G/DL (ref 14–18)
IMM GRANULOCYTES # BLD AUTO: 0.03 K/UL (ref 0–0.11)
IMM GRANULOCYTES NFR BLD AUTO: 0.4 % (ref 0–0.9)
LYMPHOCYTES # BLD AUTO: 1.97 K/UL (ref 1–4.8)
LYMPHOCYTES NFR BLD: 26.9 % (ref 22–41)
MCH RBC QN AUTO: 28.3 PG (ref 27–33)
MCHC RBC AUTO-ENTMCNC: 35 G/DL (ref 33.7–35.3)
MCV RBC AUTO: 80.8 FL (ref 81.4–97.8)
MONOCYTES # BLD AUTO: 0.8 K/UL (ref 0–0.85)
MONOCYTES NFR BLD AUTO: 10.9 % (ref 0–13.4)
NEUTROPHILS # BLD AUTO: 4.49 K/UL (ref 1.82–7.42)
NEUTROPHILS NFR BLD: 61.6 % (ref 44–72)
NRBC # BLD AUTO: 0 K/UL
NRBC BLD-RTO: 0 /100 WBC
PLATELET # BLD AUTO: 171 K/UL (ref 164–446)
PMV BLD AUTO: 9 FL (ref 9–12.9)
POTASSIUM SERPL-SCNC: 3.9 MMOL/L (ref 3.6–5.5)
PROT SERPL-MCNC: 6.1 G/DL (ref 6–8.2)
RBC # BLD AUTO: 5.58 M/UL (ref 4.7–6.1)
SODIUM SERPL-SCNC: 139 MMOL/L (ref 135–145)
WBC # BLD AUTO: 7.3 K/UL (ref 4.8–10.8)

## 2022-07-22 PROCEDURE — 80053 COMPREHEN METABOLIC PANEL: CPT

## 2022-07-22 PROCEDURE — 700102 HCHG RX REV CODE 250 W/ 637 OVERRIDE(OP): Performed by: SURGERY

## 2022-07-22 PROCEDURE — 700111 HCHG RX REV CODE 636 W/ 250 OVERRIDE (IP): Performed by: SURGERY

## 2022-07-22 PROCEDURE — 99239 HOSP IP/OBS DSCHRG MGMT >30: CPT | Performed by: REGISTERED NURSE

## 2022-07-22 PROCEDURE — 85025 COMPLETE CBC W/AUTO DIFF WBC: CPT

## 2022-07-22 PROCEDURE — 700105 HCHG RX REV CODE 258: Performed by: SURGERY

## 2022-07-22 PROCEDURE — 36415 COLL VENOUS BLD VENIPUNCTURE: CPT

## 2022-07-22 PROCEDURE — A9270 NON-COVERED ITEM OR SERVICE: HCPCS | Performed by: SURGERY

## 2022-07-22 PROCEDURE — RXMED WILLOW AMBULATORY MEDICATION CHARGE: Performed by: REGISTERED NURSE

## 2022-07-22 RX ORDER — AMOXICILLIN AND CLAVULANATE POTASSIUM 875; 125 MG/1; MG/1
1 TABLET, FILM COATED ORAL 2 TIMES DAILY
Qty: 14 TABLET | Refills: 0 | Status: SHIPPED | OUTPATIENT
Start: 2022-07-22 | End: 2022-07-29

## 2022-07-22 RX ADMIN — PIPERACILLIN AND TAZOBACTAM 4.5 G: 4; .5 INJECTION, POWDER, FOR SOLUTION INTRAVENOUS at 06:02

## 2022-07-22 RX ADMIN — LAMOTRIGINE 100 MG: 100 TABLET ORAL at 05:52

## 2022-07-22 RX ADMIN — ACETAMINOPHEN 1000 MG: 500 TABLET, FILM COATED ORAL at 05:52

## 2022-07-22 RX ADMIN — SERTRALINE 100 MG: 100 TABLET, FILM COATED ORAL at 09:22

## 2022-07-22 RX ADMIN — PREGABALIN 150 MG: 75 CAPSULE ORAL at 05:52

## 2022-07-22 NOTE — CARE PLAN
Problem: Pain - Standard  Goal: Alleviation of pain or a reduction in pain to the patient’s comfort goal  Outcome: Progressing     Problem: Knowledge Deficit - Standard  Goal: Patient and family/care givers will demonstrate understanding of plan of care, disease process/condition, diagnostic tests and medications  Outcome: Progressing     Problem: Fall Risk  Goal: Patient will remain free from falls  Outcome: Progressing     Problem: Gastrointestinal Irritability  Goal: Nausea and vomiting will be absent or improve  Outcome: Progressing     Problem: Wound/ / Incision Healing  Goal: Patient's wound/surgical incision will decrease in size and heals properly  Outcome: Progressing   The patient is Stable - Low risk of patient condition declining or worsening    Shift Goals  Clinical Goals: pain management  Patient Goals: rest, comfort  Family Goals: na    Progress made toward(s) clinical / shift goals:  progressing as tolerated    Patient is not progressing towards the following goals:

## 2022-07-22 NOTE — DISCHARGE INSTRUCTIONS
"Full Liquid Diet  A full liquid diet refers to fluids and foods that are liquid or will become liquid at room temperature. This diet should only be used for a short period of time to help you recover from illness or surgery. Your health care provider or dietitian will help you determine when it is safe to eat regular foods.  What are tips for following this plan?         Reading food labels  Check food labels of nutrition shakes for the amount of protein. Look for nutrition shakes that have at least 8-10 grams of protein in each serving.  Look for drinks, such as milks and juices, that are \"fortified\" or \"enriched.\" This means that vitamins and minerals have been added.  Shopping  Buy premade nutritional shakes to keep on hand.  To vary your choices, buy different flavors of milks and shakes.  Meal planning  Choose flavors and foods that you enjoy.  To make sure you get enough energy from food (calories):  Eat 3 full liquid meals each day. Have a liquid snack between each meal.  Drink 6-8 ounces (177-237 ml) of a nutrition supplement shake with meals or as snacks.  Add protein powder, powdered milk, milk, or yogurt to shakes to increase the amount of protein.  Drink at least one serving a day of citrus fruit juice or fruit juice that has vitamin C added.  General guidelines  Before starting the full-liquid diet, check with your health care provider to know what foods you should avoid. These may include full-fat or high-fiber liquids.  You may have any liquid or food that becomes a liquid at room temperature. The food is considered a liquid if it can be poured off a spoon at room temperature.  Do not drink alcohol unless approved by your health care provider.  This diet gives you most of the nutrients that you need for energy, but you may not get enough of certain vitamins, minerals, and fiber. Make sure to talk to your health care provider or dietitian about:  How many calories you need to eat get day.  How much " fluid you should have each day.  Taking a multivitamin or a nutritional supplement.  What foods are allowed?  The items listed may not be a complete list. Talk with your dietitian about what dietary choices are best for you.  Grains  Thin hot cereal, such as cream of wheat. Soft-cooked pasta or rice puréed in soup.  Vegetables  Pulp-free tomato or vegetable juice. Vegetables puréed in soup.  Fruits  Fruit juice without pulp. Strained fruit purées (seeds and skins removed).  Meats and other protein foods  Beef, chicken, and fish broths. Powdered protein supplements.  Dairy  Milk and milk-based beverages, including milk shakes and instant breakfast mixes. Smooth yogurt. Pureéd cottage cheese.  Beverages  Water. Coffee and tea (caffeinated or decaffeinated). Cocoa. Liquid nutritional supplements. Soft drinks. Nondairy milks, such as almond, coconut, rice, or soy milk.  Fats and oils  Melted margarine and butter. Cream. Canola, almond, avocado, corn, grapeseed, sunflower, and sesame oils. Gravy.  Sweets and desserts  Custard. Pudding. Flavored gelatin. Smooth ice cream (without nuts or candy pieces). Sherbet. Popsicles. Italian ice. Pudding pops.  Seasoning and other foods  Salt and pepper. Spices. Cocoa powder. Vinegar. Ketchup. Yellow mustard. Smooth sauces, such as Hollandaise, cheese sauce, or white sauce. Soy sauce. Cream soups. Strained soups. Syrup. Honey. Jelly (without fruit pieces).  What foods are not allowed?  The items listed may not be a complete list. Talk with your dietitian about what dietary choices are best for you.  Grains  Whole grains. Pasta. Rice. Cold cereal. Bread. Crackers.  Vegetables  All whole fresh, frozen, or canned vegetables.  Fruits  All whole fresh, frozen, or canned fruits.  Meats and other protein foods  All cuts of meat, poultry, and fish. Eggs. Tofu and soy protein. Nuts and nut butters. Lunch meat. Sausage.  Dairy  Hard cheese. Yogurt with fruit chunks.  Fats and oils  Coconut  oil. Palm oil. Lard. Cold butter.  Sweets and desserts  Ice cream or other frozen desserts that have any solids in them or on top, such as nuts, chocolate chips, and pieces of cookies. Cakes. Cookies. Candy.  Seasoning and other foods  Stone-ground mustards. Soups with chunks or pieces.  Summary  A full liquid diet refers to fluids and foods that are liquid or will become liquid at room temperature.  This diet should only be used for a short period of time to help you recover from illness or surgery. Ask your health care provider or dietitian when it is safe for you to eat regular foods.  To make sure you get enough calories and nutrients, eat 3 meals each day with snacks between. Drink premade nutrition supplement shakes or add protein powder to homemade shakes. Take a vitamin and mineral supplement as told by your health care provider.  This information is not intended to replace advice given to you by your health care provider. Make sure you discuss any questions you have with your health care provider.  Document Released: 12/18/2006 Document Revised: 03/16/2019 Document Reviewed: 01/31/2018  Elsevier Patient Education © 2020 [employer]

## 2022-07-22 NOTE — PROGRESS NOTES
IV removed. [id number] given to pt. Pt educated on dc plans, f/u info, and med compliance. Work excuse paper given to pt as well. RN walked with pt to ED entrance to meet his ride home.

## 2022-07-22 NOTE — PROGRESS NOTES
Report received from night shift RN. Pt resting in bed, NAD. Call light and personal belongings within reach, safety precautions in place. Pt is very eager to go home and get back to his routine.

## 2022-07-22 NOTE — DISCHARGE SUMMARY
Discharge Summary    DATE OF ADMISSION: 7/19/2022    DATE OF DISCHARGE: 7/22/2022    DISCHARGE DIAGNOSIS:  ? Diverticular disease of right side of colon with colonic microperforation.    CONSULTATIONS:  ? none    PROCEDURES:  ? none    BRIEF HPI and HOSPITAL COURSE:  ? Admitted with above, see admission history and physical. Underwent procedure as above. On day of discharge, the patient has stable vital signs, on room air, tolerating an oral diet, and pain is well controlled with PO meds. The patient is stable for discharge to home. The patient will follow up with  to discuss surgical options moving forward as this is not is first event. He will be discharged with oral antibiotics for another 10 days.     DISPOSITION: Discharged home on 7/22/2022. The patient was were counseled and questions were answered. Specifically, signs and symptoms of infection, respiratory decompensation, and persistent or worsening pain were discussed and the patient agrees to seek medical attention if any of these develop.    DISCHARGE MEDICATIONS:  The patients controlled substance history was reviewed and a controlled substance use informed consent (if applicable) was provided by [facility] and the patient has been prescribed.     Medication List      START taking these medications      Instructions   amoxicillin-clavulanate 875-125 MG Tabs  Commonly known as: AUGMENTIN   Take 1 Tablet by mouth 2 times a day for 7 days.  Dose: 1 Tablet        CONTINUE taking these medications      Instructions   clonazePAM 1 MG Tabs  Commonly known as: KLONOPIN   Take 2.5 mg by mouth every evening.  Dose: 2.5 mg     hydrOXYzine pamoate 50 MG Caps  Commonly known as: VISTARIL   Take 150 mg by mouth at bedtime.  Dose: 150 mg     LORAZEPAM   Take 2 mg by mouth at bedtime.  Dose: 2 mg     Lyrica 150 MG Caps  Generic drug: pregabalin   Take  by mouth 2 times a day.     methocarbamol 750 MG Tabs  Commonly known as: ROBAXIN    Take 750 mg by mouth at bedtime.  Dose: 750 mg     rosuvastatin 10 MG Tabs  Commonly known as: CRESTOR   Take 10 mg by mouth every evening.  Dose: 10 mg     sertraline 100 MG Tabs  Commonly known as: Zoloft   Take 100 mg by mouth every day.  Dose: 100 mg        STOP taking these medications    LAMICTAL PO            ACTIVITY:  Resume your regular activity.    WOUND CARE:  Not applicable.    DIET:  Orders Placed This Encounter   Procedures   • Diet Order Diet: Clear Liquid     Standing Status:   Standing     Number of Occurrences:   1     Order Specific Question:   Diet:     Answer:   Clear Liquid [10]       FOLLOW UP:  Getachew Vo M.D.  [address] [facility] [id number] [phone]    Follow up in 2 week(s)      Derrick Quinones M.D.  [address] [facility] [id number]  [phone]    Follow up in 1 week(s)        TIME SPENT ON DISCHARGE: 42 minutes      ____________________________________________  ROBBIE Hernandez    DD: 7/22/2022 10:12 AM

## 2022-07-22 NOTE — CARE PLAN
The patient is Stable - Low risk of patient condition declining or worsening    Shift Goals  Clinical Goals: pain management  Patient Goals: rest, comfort  Family Goals: na    Progress made toward(s) clinical / shift goals:  Yes    Problem: Pain - Standard  Goal: Alleviation of pain or a reduction in pain to the patient’s comfort goal  Outcome: Progressing     Problem: Knowledge Deficit - Standard  Goal: Patient and family/care givers will demonstrate understanding of plan of care, disease process/condition, diagnostic tests and medications  Outcome: Progressing     Problem: Fall Risk  Goal: Patient will remain free from falls  Outcome: Progressing     Problem: Gastrointestinal Irritability  Goal: Nausea and vomiting will be absent or improve  Outcome: Progressing     Problem: Wound/ / Incision Healing  Goal: Patient's wound/surgical incision will decrease in size and heals properly  Outcome: Progressing       Patient is not progressing towards the following goals:

## 2023-02-07 ENCOUNTER — HOSPITAL ENCOUNTER (OUTPATIENT)
Dept: RADIOLOGY | Facility: MEDICAL CENTER | Age: 38
End: 2023-02-07
Attending: NURSE PRACTITIONER
Payer: COMMERCIAL

## 2023-02-07 DIAGNOSIS — M25.511 RIGHT SHOULDER PAIN, UNSPECIFIED CHRONICITY: ICD-10-CM

## 2023-02-07 DIAGNOSIS — M54.2 CERVICALGIA: ICD-10-CM

## 2023-02-07 PROCEDURE — 72040 X-RAY EXAM NECK SPINE 2-3 VW: CPT

## 2023-02-07 PROCEDURE — 73030 X-RAY EXAM OF SHOULDER: CPT | Mod: RT

## 2023-10-26 ENCOUNTER — NON-PROVIDER VISIT (OUTPATIENT)
Dept: OCCUPATIONAL MEDICINE | Facility: CLINIC | Age: 38
End: 2023-10-26

## 2023-10-26 DIAGNOSIS — Z02.1 PRE-EMPLOYMENT DRUG SCREENING: ICD-10-CM

## 2023-10-26 LAB
AMP AMPHETAMINE: NORMAL
COC COCAINE: NORMAL
INT CON NEG: NORMAL
INT CON POS: NORMAL
MET METHAMPHETAMINES: NORMAL
OPI OPIATES: NORMAL
PCP PHENCYCLIDINE: NORMAL
POC DRUG COMMENT 753798-OCCUPATIONAL HEALTH: NORMAL
THC: NORMAL

## 2023-10-26 PROCEDURE — 80305 DRUG TEST PRSMV DIR OPT OBS: CPT | Performed by: NURSE PRACTITIONER

## 2025-01-21 ENCOUNTER — APPOINTMENT (OUTPATIENT)
Dept: RADIOLOGY | Facility: IMAGING CENTER | Age: 40
End: 2025-01-21
Payer: COMMERCIAL

## 2025-01-21 ENCOUNTER — OFFICE VISIT (OUTPATIENT)
Dept: URGENT CARE | Facility: PHYSICIAN GROUP | Age: 40
End: 2025-01-21
Payer: COMMERCIAL

## 2025-01-21 VITALS
TEMPERATURE: 98.2 F | RESPIRATION RATE: 16 BRPM | OXYGEN SATURATION: 93 % | HEIGHT: 73 IN | WEIGHT: 240.4 LBS | SYSTOLIC BLOOD PRESSURE: 126 MMHG | DIASTOLIC BLOOD PRESSURE: 74 MMHG | BODY MASS INDEX: 31.86 KG/M2 | HEART RATE: 90 BPM

## 2025-01-21 DIAGNOSIS — R09.81 HEAD CONGESTION: Primary | ICD-10-CM

## 2025-01-21 DIAGNOSIS — R05.1 ACUTE COUGH: ICD-10-CM

## 2025-01-21 DIAGNOSIS — R06.02 SOB (SHORTNESS OF BREATH): ICD-10-CM

## 2025-01-21 DIAGNOSIS — J18.9 COMMUNITY ACQUIRED PNEUMONIA, UNSPECIFIED LATERALITY: ICD-10-CM

## 2025-01-21 PROBLEM — E66.9 OBESITY: Status: ACTIVE | Noted: 2018-09-19

## 2025-01-21 PROBLEM — G40.909 EPILEPSY (HCC): Status: ACTIVE | Noted: 2025-01-21

## 2025-01-21 PROBLEM — M25.50 JOINT PAIN: Status: ACTIVE | Noted: 2025-01-21

## 2025-01-21 PROBLEM — S09.90XA INJURY OF HEAD: Status: ACTIVE | Noted: 2020-05-26

## 2025-01-21 PROBLEM — K80.20 GALLSTONE: Status: ACTIVE | Noted: 2025-01-21

## 2025-01-21 PROBLEM — K21.9 GASTROESOPHAGEAL REFLUX DISEASE: Status: ACTIVE | Noted: 2025-01-21

## 2025-01-21 PROBLEM — M54.2 CERVICALGIA: Status: ACTIVE | Noted: 2017-09-13

## 2025-01-21 PROBLEM — K76.0 NONALCOHOLIC FATTY LIVER: Status: ACTIVE | Noted: 2017-10-04

## 2025-01-21 PROBLEM — F17.200 TOBACCO DEPENDENCE SYNDROME: Status: ACTIVE | Noted: 2025-01-21

## 2025-01-21 PROBLEM — L25.9 CONTACT DERMATITIS: Status: ACTIVE | Noted: 2025-01-21

## 2025-01-21 PROBLEM — M19.019 ARTHROPATHY OF SHOULDER REGION: Status: ACTIVE | Noted: 2017-09-13

## 2025-01-21 PROBLEM — K76.9 CHRONIC NONALCOHOLIC LIVER DISEASE: Status: ACTIVE | Noted: 2025-01-21

## 2025-01-21 PROBLEM — S06.9XAA INTRACRANIAL INJURY (HCC): Status: ACTIVE | Noted: 2025-01-21

## 2025-01-21 PROBLEM — K21.00 GASTRO-ESOPHAGEAL REFLUX DISEASE WITH ESOPHAGITIS: Status: ACTIVE | Noted: 2017-10-04

## 2025-01-21 PROBLEM — R63.2 DISORDER OF HYPERALIMENTATION: Status: ACTIVE | Noted: 2025-01-21

## 2025-01-21 PROBLEM — S06.9XAA TRAUMATIC BRAIN INJURY (HCC): Status: ACTIVE | Noted: 2017-04-19

## 2025-01-21 PROBLEM — F99 MENTAL DISORDER: Status: ACTIVE | Noted: 2025-01-21

## 2025-01-21 PROBLEM — R19.8 DIGESTIVE SYMPTOM: Status: ACTIVE | Noted: 2025-01-21

## 2025-01-21 LAB
FLUAV RNA SPEC QL NAA+PROBE: NEGATIVE
FLUBV RNA SPEC QL NAA+PROBE: NEGATIVE
RSV RNA SPEC QL NAA+PROBE: NEGATIVE
SARS-COV-2 RNA RESP QL NAA+PROBE: NEGATIVE

## 2025-01-21 PROCEDURE — 99214 OFFICE O/P EST MOD 30 MIN: CPT | Mod: 25

## 2025-01-21 PROCEDURE — 3074F SYST BP LT 130 MM HG: CPT

## 2025-01-21 PROCEDURE — 0241U POCT CEPHEID COV-2, FLU A/B, RSV - PCR: CPT

## 2025-01-21 PROCEDURE — 94640 AIRWAY INHALATION TREATMENT: CPT

## 2025-01-21 PROCEDURE — 3078F DIAST BP <80 MM HG: CPT

## 2025-01-21 PROCEDURE — 71046 X-RAY EXAM CHEST 2 VIEWS: CPT | Mod: TC | Performed by: RADIOLOGY

## 2025-01-21 PROCEDURE — 94760 N-INVAS EAR/PLS OXIMETRY 1: CPT

## 2025-01-21 RX ORDER — ROSUVASTATIN CALCIUM 10 MG/1
10 TABLET, COATED ORAL
COMMUNITY

## 2025-01-21 RX ORDER — ALBUTEROL SULFATE 0.83 MG/ML
2.5 SOLUTION RESPIRATORY (INHALATION) ONCE
Status: COMPLETED | OUTPATIENT
Start: 2025-01-21 | End: 2025-01-21

## 2025-01-21 RX ORDER — DEXAMETHASONE SODIUM PHOSPHATE 4 MG/ML
8 INJECTION, SOLUTION INTRA-ARTICULAR; INTRALESIONAL; INTRAMUSCULAR; INTRAVENOUS; SOFT TISSUE ONCE
Status: COMPLETED | OUTPATIENT
Start: 2025-01-21 | End: 2025-01-21

## 2025-01-21 RX ORDER — LAMOTRIGINE 100 MG/1
TABLET ORAL
COMMUNITY

## 2025-01-21 RX ORDER — MULTIVITAMIN
1 TABLET ORAL DAILY
COMMUNITY

## 2025-01-21 RX ORDER — LORAZEPAM 2 MG/1
1 TABLET ORAL
COMMUNITY

## 2025-01-21 RX ORDER — HYDROXYZINE PAMOATE 50 MG/1
3 CAPSULE ORAL DAILY
COMMUNITY

## 2025-01-21 RX ORDER — LEVOFLOXACIN 500 MG/1
750 TABLET, FILM COATED ORAL DAILY
Qty: 8 TABLET | Refills: 0 | Status: SHIPPED | OUTPATIENT
Start: 2025-01-21 | End: 2025-01-26

## 2025-01-21 RX ORDER — ALBUTEROL SULFATE 90 UG/1
2 INHALANT RESPIRATORY (INHALATION) EVERY 6 HOURS PRN
Qty: 8.5 G | Refills: 0 | Status: SHIPPED | OUTPATIENT
Start: 2025-01-21

## 2025-01-21 RX ORDER — CLONAZEPAM 1 MG/1
1 TABLET ORAL 3 TIMES DAILY
COMMUNITY

## 2025-01-21 RX ORDER — PREDNISONE 20 MG/1
40 TABLET ORAL DAILY
Qty: 10 TABLET | Refills: 0 | Status: SHIPPED | OUTPATIENT
Start: 2025-01-21 | End: 2025-01-26

## 2025-01-21 RX ORDER — RESVER/WINE/BFL/GRPSD/PC/C/POM 200MG-60MG
400 CAPSULE ORAL
COMMUNITY

## 2025-01-21 RX ORDER — CHLORHEXIDINE GLUCONATE ORAL RINSE 1.2 MG/ML
SOLUTION DENTAL
COMMUNITY
Start: 2025-01-07

## 2025-01-21 RX ADMIN — ALBUTEROL SULFATE 2.5 MG: 0.83 SOLUTION RESPIRATORY (INHALATION) at 16:57

## 2025-01-21 RX ADMIN — DEXAMETHASONE SODIUM PHOSPHATE 8 MG: 4 INJECTION, SOLUTION INTRA-ARTICULAR; INTRALESIONAL; INTRAMUSCULAR; INTRAVENOUS; SOFT TISSUE at 16:58

## 2025-01-21 NOTE — LETTER
January 21, 2025      To Whom It May Concern:         This is confirmation that Murray ASHLEY RodriguezNuñez attended his scheduled appointment with ROBBIE Almodovar on 1/21/25.    Please excuse him from work until Monday 01/27/25 due to illness.         If you have any questions please do not hesitate to call me at the phone number listed below.    Sincerely,      CECY Almodovar.  [phone]

## 2025-01-21 NOTE — LETTER
January 21, 2025      To Whom It May Concern:         This is confirmation that Murray ASHLEY RodriguezNuñez attended his scheduled appointment with ROBBIE Almodovar on 1/21/25.    Please excuse him from work from Monday 01/20/25, Tuesday 01/21/25, until Monday 01/27/25 due to pneumonia.         If you have any questions please do not hesitate to call me at the phone number listed below.    Sincerely,      CECY Almodovar.  [phone]